# Patient Record
Sex: FEMALE | URBAN - METROPOLITAN AREA
[De-identification: names, ages, dates, MRNs, and addresses within clinical notes are randomized per-mention and may not be internally consistent; named-entity substitution may affect disease eponyms.]

---

## 2023-05-18 ENCOUNTER — OFFICE VISIT (OUTPATIENT)
Dept: URBAN - METROPOLITAN AREA CLINIC 23 | Facility: CLINIC | Age: 43
End: 2023-05-18
Payer: COMMERCIAL

## 2023-05-18 ENCOUNTER — LAB OUTSIDE AN ENCOUNTER (OUTPATIENT)
Dept: URBAN - METROPOLITAN AREA CLINIC 23 | Facility: CLINIC | Age: 43
End: 2023-05-18

## 2023-05-18 VITALS
BODY MASS INDEX: 22.29 KG/M2 | DIASTOLIC BLOOD PRESSURE: 76 MMHG | HEIGHT: 67 IN | HEART RATE: 70 BPM | WEIGHT: 142 LBS | SYSTOLIC BLOOD PRESSURE: 125 MMHG | TEMPERATURE: 98.1 F

## 2023-05-18 DIAGNOSIS — K58.0 IRRITABLE BOWEL SYNDROME WITH DIARRHEA: ICD-10-CM

## 2023-05-18 DIAGNOSIS — R82.2 BILIRUBIN IN URINE: ICD-10-CM

## 2023-05-18 DIAGNOSIS — R17 TOTAL BILIRUBIN, ELEVATED: ICD-10-CM

## 2023-05-18 PROCEDURE — 99244 OFF/OP CNSLTJ NEW/EST MOD 40: CPT | Performed by: INTERNAL MEDICINE

## 2023-05-18 PROCEDURE — 99204 OFFICE O/P NEW MOD 45 MIN: CPT | Performed by: INTERNAL MEDICINE

## 2023-05-18 RX ORDER — HYOSCYAMINE SULFATE 0.12 MG/1
1 TABLET UNDER THE TONGUE AND ALLOW TO DISSOLVE  AS NEEDED TABLET, ORALLY DISINTEGRATING ORAL THREE TIMES A DAY
Qty: 90 | Refills: 1 | OUTPATIENT
Start: 2023-05-18

## 2023-05-18 NOTE — HPI-TODAY'S VISIT:
41 yo female referred by Nicole ONEIL with [facility] . A copy of this note will be sent to the referring PA -has been told that her bili is elevated for 4 years.  1.9 this year , 1.4 last year. also noted to have bilirubin in the urine this year on urine .  no yellowing of her skin , darkening of her urine no specific abdominal pain or weight loss -intermittently has ruq cramping- probably about weekly, has been present for at least 5 years before -she has had prior ultrasound of liver /gallbladder.  -has had sx of irritable bowel - generally has been having loose stool, cramping , gas bloating for about 2014 years.  [facility]- she had an endoscopy in 2020 had no findings of celiac.   -she was started on a low fodmap diet with [facility] but feels that she has been doing better -still  however has restricted diet and feels that this has been  problematic.   when she travels has more trouble on a day to day basis has soft/solid stools.  if she eats out, fried foodsmore trouble with fruits/vegetables/oil , butter -weight has been stable  -she feels that the irritable bowel symptoms are better with low fodmap, just restrictive.  no blood in the stool, no abdominal pain generally.  -she has not had a colonoscopy  -denies any fh of colon cancer or polyps, IBD -she has not tried probiotics  - cannot toelrate dairy -she has not tried levsin

## 2023-05-19 LAB
BILIRUBIN, DIRECT: 0.33
BILIRUBIN, INDIRECT: 1.57
BILIRUBIN, TOTAL: 1.9

## 2023-05-26 ENCOUNTER — OFFICE VISIT (OUTPATIENT)
Dept: URBAN - METROPOLITAN AREA CLINIC 22 | Facility: CLINIC | Age: 43
End: 2023-05-26
Payer: COMMERCIAL

## 2023-05-26 DIAGNOSIS — K82.4 GALLBLADDER POLYP: ICD-10-CM

## 2023-05-26 DIAGNOSIS — K76.89 HEPATIC CYST: ICD-10-CM

## 2023-05-26 DIAGNOSIS — K80.20 CHOLELITHIASIS: ICD-10-CM

## 2023-05-26 PROCEDURE — 76705 ECHO EXAM OF ABDOMEN: CPT | Performed by: INTERNAL MEDICINE

## 2023-06-27 ENCOUNTER — LAB OUTSIDE AN ENCOUNTER (OUTPATIENT)
Dept: URBAN - METROPOLITAN AREA CLINIC 12 | Facility: CLINIC | Age: 43
End: 2023-06-27

## 2023-06-27 ENCOUNTER — TELEPHONE ENCOUNTER (OUTPATIENT)
Dept: URBAN - METROPOLITAN AREA CLINIC 12 | Facility: CLINIC | Age: 43
End: 2023-06-27

## 2023-07-19 ENCOUNTER — DASHBOARD ENCOUNTERS (OUTPATIENT)
Age: 43
End: 2023-07-19

## 2023-07-19 ENCOUNTER — LAB OUTSIDE AN ENCOUNTER (OUTPATIENT)
Dept: URBAN - METROPOLITAN AREA CLINIC 23 | Facility: CLINIC | Age: 43
End: 2023-07-19

## 2023-07-19 ENCOUNTER — OFFICE VISIT (OUTPATIENT)
Dept: URBAN - METROPOLITAN AREA CLINIC 23 | Facility: CLINIC | Age: 43
End: 2023-07-19
Payer: COMMERCIAL

## 2023-07-19 VITALS
WEIGHT: 136 LBS | DIASTOLIC BLOOD PRESSURE: 73 MMHG | SYSTOLIC BLOOD PRESSURE: 110 MMHG | HEIGHT: 67 IN | HEART RATE: 75 BPM | BODY MASS INDEX: 21.35 KG/M2 | TEMPERATURE: 98.6 F

## 2023-07-19 DIAGNOSIS — K76.89 LIVER CYST: ICD-10-CM

## 2023-07-19 DIAGNOSIS — K58.0 IRRITABLE BOWEL SYNDROME WITH DIARRHEA: ICD-10-CM

## 2023-07-19 DIAGNOSIS — K82.4 GALLBLADDER POLYP: ICD-10-CM

## 2023-07-19 DIAGNOSIS — R17 TOTAL BILIRUBIN, ELEVATED: ICD-10-CM

## 2023-07-19 PROBLEM — 197433003: Status: ACTIVE | Noted: 2023-07-19

## 2023-07-19 PROBLEM — 85057007: Status: ACTIVE | Noted: 2023-07-19

## 2023-07-19 PROBLEM — 235919008: Status: ACTIVE | Noted: 2023-07-19

## 2023-07-19 PROCEDURE — 99214 OFFICE O/P EST MOD 30 MIN: CPT | Performed by: INTERNAL MEDICINE

## 2023-07-19 RX ORDER — HYOSCYAMINE SULFATE 0.12 MG/1
1 TABLET UNDER THE TONGUE AND ALLOW TO DISSOLVE  AS NEEDED TABLET, ORALLY DISINTEGRATING ORAL THREE TIMES A DAY
Qty: 90 | Refills: 1 | OUTPATIENT

## 2023-07-19 RX ORDER — HYOSCYAMINE SULFATE 0.12 MG/1
1 TABLET UNDER THE TONGUE AND ALLOW TO DISSOLVE  AS NEEDED TABLET, ORALLY DISINTEGRATING ORAL THREE TIMES A DAY
Qty: 90 | Refills: 1 | Status: ACTIVE | COMMUNITY
Start: 2023-05-18

## 2023-07-19 NOTE — HPI-TODAY'S VISIT:
43 yo female referred by Nicole ONEIL with [facility] . A copy of this note will be sent to the referring PA -has been told that her bili is elevated for 4 years.  1.9 this year , 1.4 last year. also noted to have bilirubin in the urine this year on urine .  no yellowing of her skin , darkening of her urine no specific abdominal pain or weight loss -intermittently has ruq cramping- probably about weekly, has been present for at least 5 years before -she has had prior ultrasound of liver /gallbladder.  -has had sx of irritable bowel - generally has been having loose stool, cramping , gas bloating for about 2014 years.  [facility]- she had an endoscopy in 2020 had no findings of celiac.   -she was started on a low fodmap diet with [facility] but feels that she has been doing better -still  however has restricted diet and feels that this has been  problematic.   when she travels has more trouble on a day to day basis has soft/solid stools.  if she eats out, fried foodsmore trouble with fruits/vegetables/oil , butter -weight has been stable  -she feels that the irritable bowel symptoms are better with low fodmap, just restrictive.  no blood in the stool, no abdominal pain generally.  -she has not had a colonoscopy  -denies any fh of colon cancer or polyps, IBD -she has not tried probiotics  - cannot toelrate dairy -she has not tried levsin

## 2023-08-16 ENCOUNTER — WEB ENCOUNTER (OUTPATIENT)
Dept: URBAN - METROPOLITAN AREA CLINIC 23 | Facility: CLINIC | Age: 43
End: 2023-08-16

## 2023-08-18 ENCOUNTER — WEB ENCOUNTER (OUTPATIENT)
Dept: URBAN - METROPOLITAN AREA CLINIC 23 | Facility: CLINIC | Age: 43
End: 2023-08-18

## 2023-08-23 ENCOUNTER — WEB ENCOUNTER (OUTPATIENT)
Dept: URBAN - METROPOLITAN AREA CLINIC 23 | Facility: CLINIC | Age: 43
End: 2023-08-23

## 2023-08-24 ENCOUNTER — WEB ENCOUNTER (OUTPATIENT)
Dept: URBAN - METROPOLITAN AREA CLINIC 23 | Facility: CLINIC | Age: 43
End: 2023-08-24

## 2023-12-07 ENCOUNTER — WEB ENCOUNTER (OUTPATIENT)
Dept: URBAN - METROPOLITAN AREA CLINIC 23 | Facility: CLINIC | Age: 43
End: 2023-12-07

## 2023-12-12 ENCOUNTER — WEB ENCOUNTER (OUTPATIENT)
Dept: URBAN - METROPOLITAN AREA CLINIC 23 | Facility: CLINIC | Age: 43
End: 2023-12-12

## 2024-01-24 ENCOUNTER — OFFICE VISIT (OUTPATIENT)
Dept: URBAN - METROPOLITAN AREA CLINIC 23 | Facility: CLINIC | Age: 44
End: 2024-01-24